# Patient Record
Sex: FEMALE | Race: ASIAN | NOT HISPANIC OR LATINO
[De-identification: names, ages, dates, MRNs, and addresses within clinical notes are randomized per-mention and may not be internally consistent; named-entity substitution may affect disease eponyms.]

---

## 2021-07-28 ENCOUNTER — APPOINTMENT (OUTPATIENT)
Dept: CARDIOLOGY | Facility: CLINIC | Age: 31
End: 2021-07-28
Payer: MEDICAID

## 2021-07-28 VITALS
SYSTOLIC BLOOD PRESSURE: 125 MMHG | HEART RATE: 88 BPM | WEIGHT: 195 LBS | BODY MASS INDEX: 34.99 KG/M2 | HEIGHT: 62.6 IN | DIASTOLIC BLOOD PRESSURE: 85 MMHG | TEMPERATURE: 97.8 F | OXYGEN SATURATION: 97 % | RESPIRATION RATE: 16 BRPM

## 2021-07-28 DIAGNOSIS — E78.5 HYPERLIPIDEMIA, UNSPECIFIED: ICD-10-CM

## 2021-07-28 DIAGNOSIS — Z78.9 OTHER SPECIFIED HEALTH STATUS: ICD-10-CM

## 2021-07-28 DIAGNOSIS — R00.2 PALPITATIONS: ICD-10-CM

## 2021-07-28 DIAGNOSIS — K76.0 FATTY (CHANGE OF) LIVER, NOT ELSEWHERE CLASSIFIED: ICD-10-CM

## 2021-07-28 DIAGNOSIS — Z82.49 FAMILY HISTORY OF ISCHEMIC HEART DISEASE AND OTHER DISEASES OF THE CIRCULATORY SYSTEM: ICD-10-CM

## 2021-07-28 PROBLEM — Z00.00 ENCOUNTER FOR PREVENTIVE HEALTH EXAMINATION: Status: ACTIVE | Noted: 2021-07-28

## 2021-07-28 PROCEDURE — 99203 OFFICE O/P NEW LOW 30 MIN: CPT

## 2021-08-02 ENCOUNTER — APPOINTMENT (OUTPATIENT)
Dept: CARDIOLOGY | Facility: CLINIC | Age: 31
End: 2021-08-02
Payer: MEDICAID

## 2021-08-02 PROCEDURE — 93306 TTE W/DOPPLER COMPLETE: CPT

## 2021-08-03 PROBLEM — Z78.9 NON-SMOKER: Status: ACTIVE | Noted: 2021-08-03

## 2021-08-03 PROBLEM — Z78.9 SOCIAL ALCOHOL USE: Status: ACTIVE | Noted: 2021-08-03

## 2021-08-05 ENCOUNTER — NON-APPOINTMENT (OUTPATIENT)
Age: 31
End: 2021-08-05

## 2021-08-24 ENCOUNTER — NON-APPOINTMENT (OUTPATIENT)
Age: 31
End: 2021-08-24

## 2021-12-02 PROBLEM — E78.5 HLD (HYPERLIPIDEMIA): Status: ACTIVE | Noted: 2021-12-02

## 2021-12-02 PROBLEM — K76.0 FATTY LIVER: Status: ACTIVE | Noted: 2021-12-02

## 2021-12-02 PROBLEM — Z82.49 FAMILY HISTORY OF CARDIAC ARRHYTHMIA: Status: ACTIVE | Noted: 2021-12-02

## 2021-12-02 NOTE — HISTORY OF PRESENT ILLNESS
[FreeTextEntry1] : 30 year-old female with HLD presents for evaluation of palpitations.  Patient reports that two days ago she had fast HR of 106 while at PCP's office for checkup.  Patient had no symptoms.  Patient denies CP.  Patient reports SOB related to weather.  Patient denies palpitations.  Patient denies h/o syncope.  I advised patient to wear a Holter monitor.  I advised patient to undergo an echocardiogram.

## 2021-12-02 NOTE — DISCUSSION/SUMMARY
[FreeTextEntry1] : 30 year-old female with HLD presents for evaluation of palpitations. Patient reports that two days ago she had fast HR of 106 while at PCP for checkup. Patient had no symptoms. Patient denies CP. Patient reports SOB with exertion. Patient denies palpitations. Patient denies h/o syncope. I advised patient to wear a Holter monitor. I advised patient to undergo an echocardiogram. \par \par (1) Elevated HR - Patient underwent an echocardiogram and it showed normal LV function without significant valvular pathology.  Patient wore a Holter and it showed average HR of 93 without significant arrhythmia.  Patient was reassured. \par \par (2) Followup - as needed.